# Patient Record
Sex: MALE | Race: BLACK OR AFRICAN AMERICAN
[De-identification: names, ages, dates, MRNs, and addresses within clinical notes are randomized per-mention and may not be internally consistent; named-entity substitution may affect disease eponyms.]

---

## 2016-03-15 VITALS — DIASTOLIC BLOOD PRESSURE: 75 MMHG | SYSTOLIC BLOOD PRESSURE: 180 MMHG

## 2017-03-01 ENCOUNTER — HOSPITAL ENCOUNTER (OUTPATIENT)
Dept: HOSPITAL 61 - SLPLAB | Age: 77
Discharge: HOME | End: 2017-03-01
Attending: FAMILY MEDICINE
Payer: MEDICARE

## 2017-03-01 DIAGNOSIS — G47.33: Primary | ICD-10-CM

## 2017-03-01 PROCEDURE — 95810 POLYSOM 6/> YRS 4/> PARAM: CPT

## 2017-03-02 NOTE — SLEEP
DATE OF STUDY:  03/01/2017



ATTENDING PHYSICIAN:  Dr. Bob Montero.



The patient is 76 years old who weighs 270 pounds with a BMI of 38.  The

patient's [place] score was 3.  Sleep study was performed at [facility].  This was a split night study.



During the night of study, the patient spent 496 minutes in bed and slept for

395 minutes with a sleep efficiency of 80%.  The patient's sleep latency was 7

minutes with a REM latency of 178 minutes.  Overall, sleep architecture showed

increased stage I sleep, normal stage II and slow wave sleep and normal REM

sleep.



During the initial diagnostic portion of the study, the patient slept for 155

minutes.  During this time, there were 10 obstructive apneas, 85 mixed apneas,

no central apneas and 98 hypopneas.  The patient's apnea/hypopnea index during

the diagnostic portion was 75 per hour, supine index 72 per hour and REM index

was 51 per hour.



EKG monitoring revealed normal sinus rhythm.  No sustained arrhythmias were

observed.  Average heart rate was 87 beats per minute.



Review of nocturnal oximetry study revealed a mean oxygen saturation of 90% with

the lowest of 77%.  29% of time oxygen saturation remained between 80% and 89%.



PLMS were seen at index of 20 per hour, but only 1 per hour caused EEG arousals.



The patient met the criteria for CPAP initiation.  It was started at 5 cm of

water and titrated up to 15 cm of water.  Although there was a significant

improvement in the patient's apnea/hypopnea index, but complete resolution was

not obtained.  The patient slept for 37 minutes at the final pressure of 15 cm

of water and AHI was 13 per hour.  The patient had a lateral REM sleep.  The

patient's saturation remained above 91% at the final pressure.



IMPRESSION:

1.  Severe sleep apnea-hypopnea syndrome with an AHI of 75 per hour.

2.  Nocturnal hypoxia secondary to obstructive sleep apnea, but resolved with

CPAP.

3.  Moderate PLMS at an index of 20 per hour, but only 1 per hour caused EEG

arousals.  This does not need to be treated.



RECOMMENDATIONS:

1.  Optimum CPAP pressure was not achieved on this split night study.  I would

recommend that the patient should be placed on auto CPAP with a maximum pressure

of 20 cm of water and a minimum pressure of 14 cm of water.

2.  Follow up in 4-6 weeks along with a download information to assess

compliance with CPAP and to document clinical improvement.  Any adjustment in

pressure can be made if the AHI remains above 5 per hour.

3.  Weight loss is strongly advised.

4.  Avoid CNS depressants.

5.  Caution regarding driving until symptoms of sleep apnea resolve with the use

of CPAP.

6.  Clinical evaluation for restless legs syndrome if indicated.

 



______________________________

BILLY OROZCO MD



DR:  GINA/katherine  JOB#:  [id number] / [id number]

DD:  03/02/2017 09:20  DT:  03/02/2017 10:43



BOB López

## 2019-12-08 ENCOUNTER — HOSPITAL ENCOUNTER (EMERGENCY)
Dept: HOSPITAL 61 - ER | Age: 79
Discharge: HOME | End: 2019-12-08
Payer: MEDICARE

## 2019-12-08 VITALS — SYSTOLIC BLOOD PRESSURE: 182 MMHG | DIASTOLIC BLOOD PRESSURE: 87 MMHG

## 2019-12-08 VITALS — BODY MASS INDEX: 38.64 KG/M2 | WEIGHT: 276 LBS | HEIGHT: 71 IN

## 2019-12-08 DIAGNOSIS — E78.00: ICD-10-CM

## 2019-12-08 DIAGNOSIS — J10.1: Primary | ICD-10-CM

## 2019-12-08 DIAGNOSIS — I10: ICD-10-CM

## 2019-12-08 LAB
ALBUMIN SERPL-MCNC: 4.1 G/DL (ref 3.4–5)
ALBUMIN/GLOB SERPL: 0.8 {RATIO} (ref 1–1.7)
ALP SERPL-CCNC: 94 U/L (ref 46–116)
ALT SERPL-CCNC: 27 U/L (ref 16–63)
ANION GAP SERPL CALC-SCNC: 13 MMOL/L (ref 6–14)
AST SERPL-CCNC: 41 U/L (ref 15–37)
BASOPHILS # BLD AUTO: 0 X10^3/UL (ref 0–0.2)
BASOPHILS NFR BLD: 1 % (ref 0–3)
BILIRUB SERPL-MCNC: 0.9 MG/DL (ref 0.2–1)
BUN SERPL-MCNC: 10 MG/DL (ref 8–26)
BUN/CREAT SERPL: 8 (ref 6–20)
CALCIUM SERPL-MCNC: 9.8 MG/DL (ref 8.5–10.1)
CHLORIDE SERPL-SCNC: 99 MMOL/L (ref 98–107)
CO2 SERPL-SCNC: 24 MMOL/L (ref 21–32)
CREAT SERPL-MCNC: 1.3 MG/DL (ref 0.7–1.3)
EOSINOPHIL NFR BLD: 0 % (ref 0–3)
EOSINOPHIL NFR BLD: 0 X10^3/UL (ref 0–0.7)
ERYTHROCYTE [DISTWIDTH] IN BLOOD BY AUTOMATED COUNT: 15.2 % (ref 11.5–14.5)
GFR SERPLBLD BASED ON 1.73 SQ M-ARVRAT: 64.8 ML/MIN
GLOBULIN SER-MCNC: 4.9 G/DL (ref 2.2–3.8)
GLUCOSE SERPL-MCNC: 116 MG/DL (ref 70–99)
HCT VFR BLD CALC: 45.1 % (ref 39–53)
HGB BLD-MCNC: 14.9 G/DL (ref 13–17.5)
INFLUENZA A PATIENT: POSITIVE
INFLUENZA B PATIENT: NEGATIVE
LYMPHOCYTES # BLD: 0.6 X10^3/UL (ref 1–4.8)
LYMPHOCYTES NFR BLD AUTO: 8 % (ref 24–48)
MCH RBC QN AUTO: 31 PG (ref 25–35)
MCHC RBC AUTO-ENTMCNC: 33 G/DL (ref 31–37)
MCV RBC AUTO: 93 FL (ref 79–100)
MONO #: 0.6 X10^3/UL (ref 0–1.1)
MONOCYTES NFR BLD: 8 % (ref 0–9)
NEUT #: 6.1 X10^3/UL (ref 1.8–7.7)
NEUTROPHILS NFR BLD AUTO: 83 % (ref 31–73)
PLATELET # BLD AUTO: 149 X10^3/UL (ref 140–400)
POTASSIUM SERPL-SCNC: 3.7 MMOL/L (ref 3.5–5.1)
PROT SERPL-MCNC: 9 G/DL (ref 6.4–8.2)
RBC # BLD AUTO: 4.88 X10^6/UL (ref 4.3–5.7)
SODIUM SERPL-SCNC: 136 MMOL/L (ref 136–145)
WBC # BLD AUTO: 7.3 X10^3/UL (ref 4–11)

## 2019-12-08 PROCEDURE — 87804 INFLUENZA ASSAY W/OPTIC: CPT

## 2019-12-08 PROCEDURE — 80053 COMPREHEN METABOLIC PANEL: CPT

## 2019-12-08 PROCEDURE — 36415 COLL VENOUS BLD VENIPUNCTURE: CPT

## 2019-12-08 PROCEDURE — 85025 COMPLETE CBC W/AUTO DIFF WBC: CPT

## 2019-12-08 PROCEDURE — 71046 X-RAY EXAM CHEST 2 VIEWS: CPT

## 2019-12-08 NOTE — PHYS DOC
Past Medical History


Past Medical History:  High Cholesterol, Hypertension, Other


Additional Past Medical Histor:  prostate cancer


Past Surgical History:  Other


Additional Past Surgical Histo:  Prostate


Alcohol Use:  None


Drug Use:  None





Adult General


Chief Complaint


Chief Complaint:  COUGH





HPI


HPI





Patient is a 79  year old male who presents with cough this been ongoing for 3 

days. The patient also states she has associated symptoms's that he has been 

feeling sweaty, and hot and cold. Denies any pain at this time. Denies shortness

of breath. States he has not taken any medication so far today.





Review of Systems


Review of Systems





Constitutional: Denies fever or chills []


Eyes: Denies change in visual acuity, redness, or eye pain []


HENT: Denies nasal congestion or sore throat []


Respiratory: Reports cough.


Cardiovascular: No additional information not addressed in HPI []


GI: Denies abdominal pain, nausea, vomiting, bloody stools or diarrhea []


: Denies dysuria or hematuria []


Musculoskeletal: Denies back pain or joint pain []


Integument: Denies rash or skin lesions []


Neurologic: Denies headache, focal weakness or sensory changes []


Endocrine: Denies polyuria or polydipsia []





Complete systems were reviewed and found to be within normal limits, except as 

documented in this note.





Allergies


Allergies





Allergies








Coded Allergies Type Severity Reaction Last Updated Verified


 


  No Known Drug Allergies    14 No











Physical Exam


Physical Exam





Constitutional: Well developed, well nourished, no acute distress, non-toxic 

appearance. []


HENT: Normocephalic, atraumatic, bilateral external ears normal, oropharynx 

moist, no oral exudates, nose normal. []


Eyes: PERRLA, EOMI, conjunctiva normal, no discharge. [] 


Neck: Normal range of motion, no tenderness, supple, no stridor. [] 


Cardiovascular:Heart rate regular rhythm, no murmur []


Lungs & Thorax:  Bilateral breath sounds clear to auscultation []


Abdomen: Bowel sounds normal, soft, no tenderness, no masses, no pulsatile 

masses. [] 


Skin: Warm, dry, no erythema, no rash. [] 


Back: No tenderness, no CVA tenderness. [] 


Extremities: No tenderness, no cyanosis, no clubbing, ROM intact, no edema. [] 


Neurologic: Alert and oriented X 3, normal motor function, normal sensory 

function, no focal deficits noted. []


Psychologic: Affect normal, judgement normal, mood normal. []





Current Patient Data


Vital Signs





                                   Vital Signs








  Date Time  Temp Pulse Resp B/P (MAP) Pulse Ox O2 Delivery O2 Flow Rate FiO2


 


19 11:37 98.1 98 20 180/96 (124) 96 Room Air  





 98.1       








Lab Values





                                Laboratory Tests








Test


 19


11:57 19


12:05


 


Influenza Type A Antigen


 Positive


(NEGATIVE) 





 


Influenza Type B Antigen


 Negative


(NEGATIVE) 





 


White Blood Count


 


 7.3 x10^3/uL


(4.0-11.0)


 


Red Blood Count


 


 4.88 x10^6/uL


(4.30-5.70)


 


Hemoglobin


 


 14.9 g/dL


(13.0-17.5)


 


Hematocrit


 


 45.1 %


(39.0-53.0)


 


Mean Corpuscular Volume


 


 93 fL ()





 


Mean Corpuscular Hemoglobin  31 pg (25-35)  


 


Mean Corpuscular Hemoglobin


Concent 


 33 g/dL


(31-37)


 


Red Cell Distribution Width


 


 15.2 %


(11.5-14.5)  H


 


Platelet Count


 


 149 x10^3/uL


(140-400)


 


Neutrophils (%) (Auto)  83 % (31-73)  H


 


Lymphocytes (%) (Auto)  8 % (24-48)  L


 


Monocytes (%) (Auto)  8 % (0-9)  


 


Eosinophils (%) (Auto)  0 % (0-3)  


 


Basophils (%) (Auto)  1 % (0-3)  


 


Neutrophils # (Auto)


 


 6.1 x10^3/uL


(1.8-7.7)


 


Lymphocytes # (Auto)


 


 0.6 x10^3/uL


(1.0-4.8)  L


 


Monocytes # (Auto)


 


 0.6 x10^3/uL


(0.0-1.1)


 


Eosinophils # (Auto)


 


 0.0 x10^3/uL


(0.0-0.7)


 


Basophils # (Auto)


 


 0.0 x10^3/uL


(0.0-0.2)


 


Sodium Level


 


 136 mmol/L


(136-145)


 


Potassium Level


 


 3.7 mmol/L


(3.5-5.1)


 


Chloride Level


 


 99 mmol/L


()


 


Carbon Dioxide Level


 


 24 mmol/L


(21-32)


 


Anion Gap  13 (6-14)  


 


Blood Urea Nitrogen


 


 10 mg/dL


(8-26)


 


Creatinine


 


 1.3 mg/dL


(0.7-1.3)


 


Estimated GFR


(Cockcroft-Gault) 


 64.8  





 


BUN/Creatinine Ratio  8 (6-20)  


 


Glucose Level


 


 116 mg/dL


(70-99)  H


 


Calcium Level


 


 9.8 mg/dL


(8.5-10.1)


 


Total Bilirubin


 


 0.9 mg/dL


(0.2-1.0)


 


Aspartate Amino Transferase


(AST) 


 41 U/L (15-37)


H


 


Alanine Aminotransferase (ALT)


 


 27 U/L (16-63)





 


Alkaline Phosphatase


 


 94 U/L


()


 


Total Protein


 


 9.0 g/dL


(6.4-8.2)  H


 


Albumin


 


 4.1 g/dL


(3.4-5.0)


 


Albumin/Globulin Ratio


 


 0.8 (1.0-1.7)


L





                                Laboratory Tests


19 12:05








                                Laboratory Tests


19 12:05











EKG


EKG


[]





Radiology/Procedures


Radiology/Procedures


[][facility]


                    [address] [place] [address]


                                 [phone]


                                        


                                 IMAGING REPORT





                                     Signed





PATIENT: TYLER AGUILAR   ACCOUNT: [id number]     MRN#: [id number]


: 1940           LOCATION: ER              AGE: 79


SEX: M                    EXAM DT: 19         ACCESSION#: [id number]


STATUS: REG ER            ORD. PHYSICIAN: ALBANIA LOWERY


REASON: cough, fever,pt states has hacking cough and sweating.


PROCEDURE: CHEST PA & LATERAL





Study: CHEST PA   LATERAL


 


Indication: Cough and fever.


 


Comparison: 2014


 


Findings:


 


Unchanged cardiomediastinal silhouette and jarrett. No lobar consolidation, 


pleural effusion or pneumothorax. The diaphragm is flattened and the AP 


dimension of the chest increased.


 


Impression:


 


1. No acute abnormality by radiography.


2. Findings suggestive of underlying emphysema.


 


Electronically signed by: MATEUSZ PAL MD (2019 12:28 PM) [facility]














DICTATED and SIGNED BY:     MATEUSZ PAL MD


DATE:     19 [id number]





Course & Med Decision Making


Course & Med Decision Making


Pertinent Labs and Imaging studies reviewed. (See chart for details)





Will get labs and chest x-ray.





Labs show Flu A positive. Labs are otherwise unremarkable. Chest x-ray is 

unremarkable.





Dragon Disclaimer


Dragon Disclaimer


This electronic medical record was generated, in whole or in part, using a voice

 recognition dictation system.





Departure


Departure


Impression:  


   Primary Impression:  


   Influenza A


Disposition:   HOME, SELF-CARE


Condition:  STABLE


Referrals:  


CLEO OSMAN MD (PCP)


Patient Instructions:  Influenza A (H1N1)





Additional Instructions:  


Thank you for visiting [facility]. We appreciate you trusting us 

with your care. If any additional problems come up don't hesitate to return to 

visit us. Please follow up with your primary care provider so they can plan 

additional care if needed and know about the problem that you had. If symptoms 

worsen come back to the Emergency Department. Any concerning symptoms that start

 such as chest pain, shortness of air, weakness or numbness on one side of the 

body, running high fevers or any other concerning symptoms return to the ER.





Please drink plenty fluids and get plenty rest.


Scripts


Benzonatate (TESSALON PERLE) 100 Mg Capsule


100 MG PO TID PRN for COUGH, #21 CAP


   Prov: ALBANIA LOWERY         19











ALBANIA LOWERY           Dec 8, 2019 11:52

## 2019-12-08 NOTE — RAD
Study: CHEST PA   LATERAL

 

Indication: Cough and fever.

 

Comparison: 12/30/2014

 

Findings:

 

Unchanged cardiomediastinal silhouette and jarrett. No lobar consolidation, 

pleural effusion or pneumothorax. The diaphragm is flattened and the AP 

dimension of the chest increased.

 

Impression:

 

1. No acute abnormality by radiography.

2. Findings suggestive of underlying emphysema.

 

Electronically signed by: MATEUSZ PAL MD (12/8/2019 12:28 PM) [facility]

## 2019-12-11 ENCOUNTER — HOSPITAL ENCOUNTER (EMERGENCY)
Dept: HOSPITAL 61 - ER | Age: 79
LOS: 1 days | Discharge: HOME | End: 2019-12-12
Payer: MEDICARE

## 2019-12-11 VITALS — BODY MASS INDEX: 38.64 KG/M2 | HEIGHT: 71 IN | WEIGHT: 276 LBS

## 2019-12-11 DIAGNOSIS — R11.0: Primary | ICD-10-CM

## 2019-12-11 DIAGNOSIS — E78.00: ICD-10-CM

## 2019-12-11 DIAGNOSIS — I10: ICD-10-CM

## 2019-12-11 PROCEDURE — 96374 THER/PROPH/DIAG INJ IV PUSH: CPT

## 2019-12-11 PROCEDURE — 99284 EMERGENCY DEPT VISIT MOD MDM: CPT

## 2019-12-11 PROCEDURE — 85025 COMPLETE CBC W/AUTO DIFF WBC: CPT

## 2019-12-11 PROCEDURE — 36415 COLL VENOUS BLD VENIPUNCTURE: CPT

## 2019-12-11 PROCEDURE — 80053 COMPREHEN METABOLIC PANEL: CPT

## 2019-12-11 NOTE — PHYS DOC
Past Medical History


Past Medical History:  High Cholesterol, Hypertension, Other


Additional Past Medical Histor:  prostate cancer


Past Surgical History:  Other


Additional Past Surgical Histo:  Prostate


Alcohol Use:  None


Drug Use:  None





Adult General


Chief Complaint


Chief Complaint:  ABDOMINAL PAIN





HPI


HPI





79-year-old male presents to emergency Department complaints of nausea. Patient 

was diagnosed with influenza a on Sunday he subsequently was seen by his primary

care physician today and started on Z-Gordy. Patient states he took the Z-Gordy on 

an empty stomach at least 2 pills subsequently developed nausea afterwards. He 

denies any vomiting. Patient is afebrile, he denies any chest pain, shortness 

breath, vomiting headache or visual changes. Nothing makes his symptoms worse, 

nothing makes his symptoms better.





Review of Systems


Review of Systems





Constitutional: Denies fever or chills []


HENT: Denies nasal congestion or sore throat []


Respiratory: Denies cough or shortness of breath []


Cardiovascular: No additional information not addressed in HPI []


GI: Denies abdominal pain, + nausea, no vomiting, bloody stools or diarrhea []


: Denies dysuria or hematuria []


Musculoskeletal: Denies back pain or joint pain []


Neurologic: Denies headache, focal weakness or sensory changes []





All other systems were reviewed and found to be within normal limits, except as 

documented in this note.





Current Medications


Current Medications





Current Medications








 Medications


  (Trade)  Dose


 Ordered  Sig/Edith  Start Time


 Stop Time Status Last Admin


Dose Admin


 


 Ondansetron HCl


  (Zofran Odt)  4 mg  1X  ONCE  12/11/19 23:15


 12/11/19 23:16 DC 12/11/19 22:45


4 MG


 


 Ondansetron HCl


  (Zofran)  4 mg  1X  ONCE  12/12/19 00:00


 12/12/19 00:01 DC 12/12/19 00:35


4 MG


 


 Sodium Chloride  1,000 ml @ 


 1,000 mls/hr  1X  ONCE  12/12/19 00:00


 12/12/19 00:59 DC 12/12/19 00:35


1,000 MLS/HR











Allergies


Allergies





Allergies








Coded Allergies Type Severity Reaction Last Updated Verified


 


  No Known Drug Allergies    12/18/14 No











Physical Exam


Physical Exam





Constitutional: Well developed, well nourished, no acute distress, non-toxic 

appearance. []


HENT: Normocephalic, atraumatic, bilateral external ears normal, oropharynx 

moist, no oral exudates, nose normal. []


Eyes: PERRLA, EOMI, conjunctiva normal, no discharge. [] 


Cardiovascular:Heart rate regular rhythm, no murmur []


Lungs & Thorax:  Bilateral breath sounds clear to auscultation []


Abdomen: Bowel sounds normal, soft, no tenderness, no masses, no pulsatile 

masses. [] 


Skin: Warm, dry, no erythema, no rash. [] 


Back: No tenderness, no CVA tenderness. [] 


Extremities: No tenderness, no edema. [] 


Neurologic: Alert and oriented X 3, no focal deficits noted. []


Psychologic: Affect normal, judgement normal, mood normal. []





Current Patient Data


Vital Signs





                                   Vital Signs








  Date Time  Temp Pulse Resp B/P (MAP) Pulse Ox O2 Delivery O2 Flow Rate FiO2


 


12/12/19 00:44  66 17 187/81 (116) 99 Room Air  


 


12/11/19 22:37 98.7       





 98.7       








Lab Values





                                Laboratory Tests








Test


 12/12/19


00:30


 


White Blood Count


 6.0 x10^3/uL


(4.0-11.0)


 


Red Blood Count


 4.99 x10^6/uL


(4.30-5.70)


 


Hemoglobin


 15.2 g/dL


(13.0-17.5)


 


Hematocrit


 45.8 %


(39.0-53.0)


 


Mean Corpuscular Volume


 92 fL ()





 


Mean Corpuscular Hemoglobin 31 pg (25-35)  


 


Mean Corpuscular Hemoglobin


Concent 33 g/dL


(31-37)


 


Red Cell Distribution Width


 14.7 %


(11.5-14.5)  H


 


Platelet Count


 181 x10^3/uL


(140-400)


 


Neutrophils (%) (Auto) 68 % (31-73)  


 


Lymphocytes (%) (Auto) 21 % (24-48)  L


 


Monocytes (%) (Auto) 9 % (0-9)  


 


Eosinophils (%) (Auto) 0 % (0-3)  


 


Basophils (%) (Auto) 1 % (0-3)  


 


Neutrophils # (Auto)


 4.1 x10^3/uL


(1.8-7.7)


 


Lymphocytes # (Auto)


 1.3 x10^3/uL


(1.0-4.8)


 


Monocytes # (Auto)


 0.5 x10^3/uL


(0.0-1.1)


 


Eosinophils # (Auto)


 0.0 x10^3/uL


(0.0-0.7)


 


Basophils # (Auto)


 0.1 x10^3/uL


(0.0-0.2)


 


Sodium Level


 138 mmol/L


(136-145)


 


Potassium Level


 3.5 mmol/L


(3.5-5.1)


 


Chloride Level


 101 mmol/L


()


 


Carbon Dioxide Level


 22 mmol/L


(21-32)


 


Anion Gap 15 (6-14)  H


 


Blood Urea Nitrogen


 17 mg/dL


(8-26)


 


Creatinine


 1.5 mg/dL


(0.7-1.3)  H


 


Estimated GFR


(Cockcroft-Gault) 54.9  





 


BUN/Creatinine Ratio 11 (6-20)  


 


Glucose Level


 182 mg/dL


(70-99)  H


 


Calcium Level


 9.9 mg/dL


(8.5-10.1)


 


Total Bilirubin


 1.0 mg/dL


(0.2-1.0)


 


Aspartate Amino Transferase


(AST) 40 U/L (15-37)


H


 


Alanine Aminotransferase (ALT)


 36 U/L (16-63)





 


Alkaline Phosphatase


 80 U/L


()


 


Total Protein


 8.8 g/dL


(6.4-8.2)  H


 


Albumin


 3.9 g/dL


(3.4-5.0)


 


Albumin/Globulin Ratio


 0.8 (1.0-1.7)


L





                                Laboratory Tests


12/12/19 00:30








                                Laboratory Tests


12/12/19 00:30











EKG


EKG


[]





Radiology/Procedures


Radiology/Procedures


[]





Course & Med Decision Making


Course & Med Decision Making


Pertinent Labs and Imaging studies reviewed. (See chart for details)





[]79-year-old male presents to emergency Department complaints of nausea. 

Patient was diagnosed with influenza a on Sunday he subsequently was seen by his

 primary care physician today and started on Z-Gordy. Patient states he took the 

Z-Gordy on an empty stomach at least 2 pills subsequently developed nausea 

afterwards. He denies any vomiting. Patient is afebrile, he denies any chest 

pain, shortness breath, vomiting headache or visual changes. Nothing makes his 

symptoms worse, nothing makes his symptoms better.





Patient provided with Zofran by mouth.  He states no improvement. IV saline lock

 initiated with IV Zofran, IV fluids. Labs obtained.


Labs reviewed, creatinine 1.5, otherwise unremarkable


Patient overall improved


Plan Dc home with follow up as outpatient with PCP


Recommend po intake prior to abx use





Dragon Disclaimer


Dragon Disclaimer


This electronic medical record was generated, in whole or in part, using a voice

 recognition dictation system.





Departure


Departure


Impression:  


   Primary Impression:  


   Nausea


Disposition:  01 HOME, SELF-CARE


Condition:  IMPROVED


Referrals:  


BOB BENNETT (PCP)


Patient Instructions:  Nausea, Adult, Easy-to-Read





Additional Instructions:  


Recommend follow up with PCP 3 - 5 days


Return to the ER with worsening symptoms, intractable pain, fever, altered 

mental status


Tylenol/Motrin as needed for pain


Zofran as needed


Scripts


Ondansetron Hcl (ZOFRAN) 4 Mg Tablet


1 TAB PO PRN Q6-8HRS for nausea, #12 TAB


   Prov: RACHELLE GO MD         12/12/19











RACHELLE GO MD              Dec 11, 2019 23:24

## 2019-12-12 VITALS
DIASTOLIC BLOOD PRESSURE: 84 MMHG | DIASTOLIC BLOOD PRESSURE: 84 MMHG | SYSTOLIC BLOOD PRESSURE: 190 MMHG | SYSTOLIC BLOOD PRESSURE: 190 MMHG

## 2019-12-12 LAB
ALBUMIN SERPL-MCNC: 3.9 G/DL (ref 3.4–5)
ALBUMIN/GLOB SERPL: 0.8 {RATIO} (ref 1–1.7)
ALP SERPL-CCNC: 80 U/L (ref 46–116)
ALT SERPL-CCNC: 36 U/L (ref 16–63)
ANION GAP SERPL CALC-SCNC: 15 MMOL/L (ref 6–14)
AST SERPL-CCNC: 40 U/L (ref 15–37)
BASOPHILS # BLD AUTO: 0.1 X10^3/UL (ref 0–0.2)
BASOPHILS NFR BLD: 1 % (ref 0–3)
BILIRUB SERPL-MCNC: 1 MG/DL (ref 0.2–1)
BUN SERPL-MCNC: 17 MG/DL (ref 8–26)
BUN/CREAT SERPL: 11 (ref 6–20)
CALCIUM SERPL-MCNC: 9.9 MG/DL (ref 8.5–10.1)
CHLORIDE SERPL-SCNC: 101 MMOL/L (ref 98–107)
CO2 SERPL-SCNC: 22 MMOL/L (ref 21–32)
CREAT SERPL-MCNC: 1.5 MG/DL (ref 0.7–1.3)
EOSINOPHIL NFR BLD: 0 % (ref 0–3)
EOSINOPHIL NFR BLD: 0 X10^3/UL (ref 0–0.7)
ERYTHROCYTE [DISTWIDTH] IN BLOOD BY AUTOMATED COUNT: 14.7 % (ref 11.5–14.5)
GFR SERPLBLD BASED ON 1.73 SQ M-ARVRAT: 54.9 ML/MIN
GLOBULIN SER-MCNC: 4.9 G/DL (ref 2.2–3.8)
GLUCOSE SERPL-MCNC: 182 MG/DL (ref 70–99)
HCT VFR BLD CALC: 45.8 % (ref 39–53)
HGB BLD-MCNC: 15.2 G/DL (ref 13–17.5)
LYMPHOCYTES # BLD: 1.3 X10^3/UL (ref 1–4.8)
LYMPHOCYTES NFR BLD AUTO: 21 % (ref 24–48)
MCH RBC QN AUTO: 31 PG (ref 25–35)
MCHC RBC AUTO-ENTMCNC: 33 G/DL (ref 31–37)
MCV RBC AUTO: 92 FL (ref 79–100)
MONO #: 0.5 X10^3/UL (ref 0–1.1)
MONOCYTES NFR BLD: 9 % (ref 0–9)
NEUT #: 4.1 X10^3/UL (ref 1.8–7.7)
NEUTROPHILS NFR BLD AUTO: 68 % (ref 31–73)
PLATELET # BLD AUTO: 181 X10^3/UL (ref 140–400)
POTASSIUM SERPL-SCNC: 3.5 MMOL/L (ref 3.5–5.1)
PROT SERPL-MCNC: 8.8 G/DL (ref 6.4–8.2)
RBC # BLD AUTO: 4.99 X10^6/UL (ref 4.3–5.7)
SODIUM SERPL-SCNC: 138 MMOL/L (ref 136–145)
WBC # BLD AUTO: 6 X10^3/UL (ref 4–11)

## 2021-01-12 ENCOUNTER — HOSPITAL ENCOUNTER (OUTPATIENT)
Dept: HOSPITAL 61 - RT | Age: 81
End: 2021-01-12
Attending: INTERNAL MEDICINE
Payer: MEDICARE

## 2021-01-12 DIAGNOSIS — G47.33: Primary | ICD-10-CM

## 2021-01-12 PROCEDURE — 95811 POLYSOM 6/>YRS CPAP 4/> PARM: CPT

## 2021-01-13 NOTE — SLEEP
DATE OF STUDY:  



SLEEP STUDY



ATTENDING PHYSICIAN:  Dr. Bob Montero.



The patient is 80 years old, who weighs 262 pounds with a BMI of 36.  The

patient had a sleep study back in 03/2017.  At that time, he had severe REINALDO at

an AHI of 75 per hour.  He was recommended to have an auto CPAP as an optimum

CVAP pressure was not achieved.  He was recommended a maximum CPAP pressure of

20 and a minimum of 14 cm of water.  The patient could not tolerate CPAP and did

not use it for last 6 months.  He was referred back for another titration study.



During the night study, the patient spent 484 minutes in bed and slept for 310

minutes with a sleep efficiency of 64%.  Sleep latency was 26 minutes with a REM

latency of 48 minutes.  Sleep architecture showed increased stage 1 and stage 2

sleep, absent slow wave and reduced REM sleep.



The patient was started on BiPAP at a pressure of 15/10.  The pressure was

titrated all the way up to 25/18.  Backup rate of 10 was added due to persistent

central apneas.  The patient was then switched to AVAPS/auto SV.  At the final

pressure, the patient slept for 51 minutes.  Therapeutic pressure was not

achieved, However patient did best at the the following pressure

 I would recommend that the patient should be placed on AVAPS at a

max pressure of of 30, E-PAP max of 15 cm of water, E-PAP minimum of 4.  Maximum

pressure support of 20 and a minimum pressure support minimum of 4.Rate auto.  
The patient

used full-face medium mask.Patients AHI was 15/hr, all central apneas at the 
final AVAPS



EKG monitoring revealed an average heart rate of 84 beats per minute, no

sustained arrhythmias observed.



No PLMs observed.



IMPRESSION:

1.  Severe sleep apnea with persistence of central apneas throughout the night

with inability to obtain a therapeutic BiPAP pressure. Patient did better with 
AVAPS

2.  Nocturnal hypoxia, resolved with final AVAPS pressure.

3.  No clinically significant PLMs.



RECOMMENDATIONS:

1.  The patient should be placed on AVAPS.  max pressure of 30 cm, E-PAP max of 
15 cm of

water, E-PAP minimum of 4 cm of water.  Maximum pressure support of 20, minimum 
pressure support of 4 and rate auto.

Patients central apneas were likely "Treatment emergent central apneas" , 
however patient should be ruled out for any cardiomyopathy

contributing to central apnea. Patient should avoid any narcotics.

2.  The patient should have a followup in 4-6 weeks to assess compliance and to

document clinical improvement.

3.  Weight loss is strongly advised.

4.  Avoid CNS depressants.

5.  Cautioned regarding driving until symptoms of sleep apnea resolve with above

recommendations.

 



______________________________

BILLY OROZCO MD



DR:  GINA/katherine  JOB#:  [id number] / [id number]

DD:  01/13/2021 09:59  DT:  01/13/2021 10:55



BOB López